# Patient Record
Sex: FEMALE | Race: WHITE | Employment: FULL TIME | URBAN - METROPOLITAN AREA
[De-identification: names, ages, dates, MRNs, and addresses within clinical notes are randomized per-mention and may not be internally consistent; named-entity substitution may affect disease eponyms.]

---

## 2023-03-24 LAB — GROUP B STREP SCREEN: ABNORMAL

## 2023-09-25 PROBLEM — E66.01 MORBID OBESITY WITH BMI OF 40.0-44.9, ADULT (MULTI): Status: ACTIVE | Noted: 2023-09-25

## 2023-09-25 PROBLEM — N76.0 VAGINITIS: Status: ACTIVE | Noted: 2023-09-25

## 2023-09-25 PROBLEM — L40.9 PSORIASIS: Status: ACTIVE | Noted: 2023-09-25

## 2023-09-25 PROBLEM — R73.9 HYPERGLYCEMIA: Status: ACTIVE | Noted: 2023-09-25

## 2023-09-25 PROBLEM — D72.829 LEUKOCYTOSIS: Status: ACTIVE | Noted: 2023-09-25

## 2023-09-25 PROBLEM — E87.6 HYPOKALEMIA: Status: ACTIVE | Noted: 2023-09-25

## 2023-09-25 PROBLEM — E53.8 VITAMIN B12 DEFICIENCY: Status: ACTIVE | Noted: 2023-09-25

## 2023-09-25 PROBLEM — F17.200 CURRENT EVERY DAY SMOKER: Status: ACTIVE | Noted: 2023-09-25

## 2023-09-25 PROBLEM — B37.31 YEAST VAGINITIS: Status: ACTIVE | Noted: 2023-09-25

## 2023-09-25 PROBLEM — I10 BENIGN ESSENTIAL HYPERTENSION: Status: ACTIVE | Noted: 2023-09-25

## 2023-09-25 PROBLEM — A74.9 CHLAMYDIA INFECTION: Status: ACTIVE | Noted: 2023-09-25

## 2023-09-25 PROBLEM — R74.8 ELEVATED ALKALINE PHOSPHATASE LEVEL: Status: ACTIVE | Noted: 2023-09-25

## 2023-09-25 PROBLEM — M19.90 ARTHRITIS: Status: ACTIVE | Noted: 2023-09-25

## 2023-09-25 PROBLEM — N91.5 OLIGOMENORRHEA: Status: ACTIVE | Noted: 2023-09-25

## 2023-09-25 PROBLEM — F41.9 ANXIETY DISORDER: Status: ACTIVE | Noted: 2023-09-25

## 2023-09-25 PROBLEM — O09.90 HIGH RISK PREGNANCY, ANTEPARTUM (HHS-HCC): Status: ACTIVE | Noted: 2023-09-25

## 2023-09-25 PROBLEM — E28.2 POLYCYSTIC OVARIES: Status: ACTIVE | Noted: 2023-09-25

## 2023-09-25 PROBLEM — R73.09 ELEVATED GLUCOSE TOLERANCE TEST: Status: ACTIVE | Noted: 2023-09-25

## 2023-09-25 PROBLEM — N89.8 VAGINAL DISCHARGE: Status: ACTIVE | Noted: 2023-09-25

## 2023-09-25 PROBLEM — O99.210 OBESITY IN PREGNANCY (HHS-HCC): Status: ACTIVE | Noted: 2023-09-25

## 2023-09-25 PROBLEM — O10.919: Status: ACTIVE | Noted: 2023-09-25

## 2023-09-25 PROBLEM — L40.50 PSORIATIC ARTHRITIS (MULTI): Status: ACTIVE | Noted: 2023-09-25

## 2023-09-25 RX ORDER — CLOBETASOL PROPIONATE 0.46 MG/ML
SOLUTION TOPICAL DAILY PRN
COMMUNITY
Start: 2022-10-05

## 2023-09-25 RX ORDER — ESCITALOPRAM OXALATE 20 MG/1
1 TABLET ORAL DAILY
COMMUNITY
Start: 2017-02-06 | End: 2023-12-13

## 2023-09-25 RX ORDER — ACETAMINOPHEN, DEXTROMETHORPHAN HBR, DOXYLAMINE SUCCINATE, PHENYLEPHRINE HCL 650; 20; 12.5; 1 MG/30ML; MG/30ML; MG/30ML; MG/30ML
1 SOLUTION ORAL DAILY
COMMUNITY
Start: 2019-10-17

## 2023-09-25 RX ORDER — METFORMIN HYDROCHLORIDE 500 MG/1
1000 TABLET ORAL DAILY
COMMUNITY

## 2023-09-25 RX ORDER — BUSPIRONE HYDROCHLORIDE 10 MG/1
1 TABLET ORAL 2 TIMES DAILY
COMMUNITY
Start: 2022-06-01 | End: 2023-10-31

## 2023-09-25 RX ORDER — LABETALOL 100 MG/1
200 TABLET, FILM COATED ORAL EVERY 12 HOURS
COMMUNITY
Start: 2022-10-13 | End: 2023-11-22

## 2023-09-25 RX ORDER — BUSPIRONE HYDROCHLORIDE 15 MG/1
15 TABLET ORAL 2 TIMES DAILY
COMMUNITY
End: 2024-03-22

## 2023-09-25 RX ORDER — B-COMPLEX WITH VITAMIN C
1 TABLET ORAL DAILY
COMMUNITY
Start: 2022-01-18

## 2023-10-30 DIAGNOSIS — F41.9 ANXIETY DISORDER, UNSPECIFIED TYPE: Primary | ICD-10-CM

## 2023-10-31 RX ORDER — BUSPIRONE HYDROCHLORIDE 10 MG/1
10 TABLET ORAL 2 TIMES DAILY
Qty: 180 TABLET | Refills: 0 | Status: SHIPPED | OUTPATIENT
Start: 2023-10-31 | End: 2024-02-01

## 2023-11-21 DIAGNOSIS — I10 BENIGN ESSENTIAL HYPERTENSION: Primary | ICD-10-CM

## 2023-11-22 RX ORDER — LABETALOL 100 MG/1
2 TABLET, FILM COATED ORAL EVERY 12 HOURS
Qty: 360 TABLET | Refills: 0 | Status: SHIPPED | OUTPATIENT
Start: 2023-11-22 | End: 2024-04-03

## 2023-12-11 DIAGNOSIS — F41.9 ANXIETY DISORDER, UNSPECIFIED TYPE: Primary | ICD-10-CM

## 2023-12-13 RX ORDER — ESCITALOPRAM OXALATE 20 MG/1
TABLET ORAL
Qty: 90 TABLET | Refills: 0 | Status: SHIPPED | OUTPATIENT
Start: 2023-12-13 | End: 2024-03-12

## 2024-01-18 ENCOUNTER — APPOINTMENT (OUTPATIENT)
Dept: PRIMARY CARE | Facility: CLINIC | Age: 34
End: 2024-01-18
Payer: COMMERCIAL

## 2024-01-25 PROBLEM — N97.9 FEMALE INFERTILITY: Status: ACTIVE | Noted: 2024-01-25

## 2024-01-25 PROBLEM — R10.2 PELVIC PAIN IN FEMALE: Status: ACTIVE | Noted: 2024-01-25

## 2024-01-25 PROBLEM — R53.83 FATIGUE: Status: ACTIVE | Noted: 2024-01-25

## 2024-01-25 PROBLEM — F41.8 MIXED ANXIETY DEPRESSIVE DISORDER: Status: ACTIVE | Noted: 2024-01-25

## 2024-01-29 DIAGNOSIS — F41.9 ANXIETY DISORDER, UNSPECIFIED TYPE: ICD-10-CM

## 2024-01-30 ENCOUNTER — APPOINTMENT (OUTPATIENT)
Dept: PRIMARY CARE | Facility: CLINIC | Age: 34
End: 2024-01-30
Payer: COMMERCIAL

## 2024-02-01 RX ORDER — BUSPIRONE HYDROCHLORIDE 10 MG/1
10 TABLET ORAL 2 TIMES DAILY
Qty: 180 TABLET | Refills: 0 | Status: SHIPPED | OUTPATIENT
Start: 2024-02-01

## 2024-02-06 PROBLEM — O10.919: Status: RESOLVED | Noted: 2023-09-25 | Resolved: 2024-02-06

## 2024-02-13 ENCOUNTER — APPOINTMENT (OUTPATIENT)
Dept: PRIMARY CARE | Facility: CLINIC | Age: 34
End: 2024-02-13
Payer: COMMERCIAL

## 2024-03-11 DIAGNOSIS — F41.9 ANXIETY DISORDER, UNSPECIFIED TYPE: ICD-10-CM

## 2024-03-11 NOTE — LETTER
March 18, 2024        Shantell Crowell  [address]  [facility] [address]            Dear Ms. Crowell:    Please call [phone] to schedule a follow up appointment with  today.    Sincerely,    formerly [facility]

## 2024-03-12 RX ORDER — ESCITALOPRAM OXALATE 20 MG/1
TABLET ORAL
Qty: 30 TABLET | Refills: 0 | Status: SHIPPED | OUTPATIENT
Start: 2024-03-12

## 2024-03-22 DIAGNOSIS — R73.09 ELEVATED GLUCOSE TOLERANCE TEST: ICD-10-CM

## 2024-03-22 DIAGNOSIS — F41.9 ANXIETY DISORDER, UNSPECIFIED TYPE: Primary | ICD-10-CM

## 2024-03-22 DIAGNOSIS — I10 BENIGN ESSENTIAL HYPERTENSION: ICD-10-CM

## 2024-03-22 RX ORDER — METFORMIN HYDROCHLORIDE 500 MG/1
1000 TABLET, EXTENDED RELEASE ORAL DAILY
Qty: 60 TABLET | Refills: 0 | Status: SHIPPED | OUTPATIENT
Start: 2024-03-22

## 2024-03-22 RX ORDER — BUSPIRONE HYDROCHLORIDE 15 MG/1
15 TABLET ORAL EVERY 12 HOURS
Qty: 60 TABLET | Refills: 0 | Status: SHIPPED | OUTPATIENT
Start: 2024-03-22

## 2024-03-26 NOTE — TELEPHONE ENCOUNTER
Left vm message and sent MyChart message to PT to confirm correct dosage and frequency of labetalol.

## 2024-04-03 RX ORDER — LABETALOL 100 MG/1
200 TABLET, FILM COATED ORAL 2 TIMES DAILY
Qty: 360 TABLET | Refills: 1 | Status: SHIPPED | OUTPATIENT
Start: 2024-04-03

## 2024-04-11 PROBLEM — N97.9 INFERTILITY, FEMALE: Status: RESOLVED | Noted: 2024-04-11 | Resolved: 2024-04-11

## 2024-04-11 PROBLEM — O09.90 HIGH RISK PREGNANCY, ANTEPARTUM (HHS-HCC): Status: RESOLVED | Noted: 2024-04-11 | Resolved: 2024-04-11

## 2024-07-11 DIAGNOSIS — I10 BENIGN ESSENTIAL HYPERTENSION: ICD-10-CM

## 2024-07-11 RX ORDER — LABETALOL 100 MG/1
200 TABLET, FILM COATED ORAL 2 TIMES DAILY
Qty: 120 TABLET | Refills: 0 | Status: SHIPPED | OUTPATIENT
Start: 2024-07-11

## 2024-07-18 ENCOUNTER — APPOINTMENT (OUTPATIENT)
Dept: PRIMARY CARE | Facility: CLINIC | Age: 34
End: 2024-07-18
Payer: COMMERCIAL

## 2024-07-18 VITALS
BODY MASS INDEX: 43.19 KG/M2 | WEIGHT: 253 LBS | HEART RATE: 84 BPM | DIASTOLIC BLOOD PRESSURE: 110 MMHG | HEIGHT: 64 IN | SYSTOLIC BLOOD PRESSURE: 159 MMHG

## 2024-07-18 DIAGNOSIS — E66.01 MORBID OBESITY WITH BMI OF 40.0-44.9, ADULT (MULTI): ICD-10-CM

## 2024-07-18 DIAGNOSIS — R73.9 HYPERGLYCEMIA: ICD-10-CM

## 2024-07-18 DIAGNOSIS — F41.9 ANXIETY DISORDER, UNSPECIFIED TYPE: ICD-10-CM

## 2024-07-18 DIAGNOSIS — E53.8 VITAMIN B12 DEFICIENCY: ICD-10-CM

## 2024-07-18 DIAGNOSIS — Z00.00 HEALTHCARE MAINTENANCE: ICD-10-CM

## 2024-07-18 DIAGNOSIS — F17.200 CURRENT EVERY DAY SMOKER: ICD-10-CM

## 2024-07-18 DIAGNOSIS — I10 BENIGN ESSENTIAL HYPERTENSION: Primary | ICD-10-CM

## 2024-07-18 DIAGNOSIS — L40.50 PSORIATIC ARTHRITIS (MULTI): ICD-10-CM

## 2024-07-18 PROCEDURE — 99214 OFFICE O/P EST MOD 30 MIN: CPT | Performed by: FAMILY MEDICINE

## 2024-07-18 PROCEDURE — 3080F DIAST BP >= 90 MM HG: CPT | Performed by: FAMILY MEDICINE

## 2024-07-18 PROCEDURE — 3077F SYST BP >= 140 MM HG: CPT | Performed by: FAMILY MEDICINE

## 2024-07-18 PROCEDURE — 3008F BODY MASS INDEX DOCD: CPT | Performed by: FAMILY MEDICINE

## 2024-07-18 RX ORDER — ACETAMINOPHEN, DEXTROMETHORPHAN HBR, DOXYLAMINE SUCCINATE, PHENYLEPHRINE HCL 650; 20; 12.5; 1 MG/30ML; MG/30ML; MG/30ML; MG/30ML
1 SOLUTION ORAL DAILY
Qty: 90 TABLET | Refills: 1 | Status: SHIPPED | OUTPATIENT
Start: 2024-07-18

## 2024-07-18 RX ORDER — BUSPIRONE HYDROCHLORIDE 15 MG/1
15 TABLET ORAL EVERY 12 HOURS
Qty: 60 TABLET | Refills: 0 | Status: SHIPPED | OUTPATIENT
Start: 2024-07-18

## 2024-07-18 RX ORDER — NIFEDIPINE 30 MG/1
30 TABLET, FILM COATED, EXTENDED RELEASE ORAL DAILY
Qty: 90 TABLET | Refills: 0 | Status: SHIPPED | OUTPATIENT
Start: 2024-07-18 | End: 2024-10-16

## 2024-07-18 RX ORDER — B-COMPLEX WITH VITAMIN C
1 TABLET ORAL DAILY
Qty: 90 TABLET | Refills: 3 | Status: SHIPPED | OUTPATIENT
Start: 2024-07-18

## 2024-07-18 ASSESSMENT — PATIENT HEALTH QUESTIONNAIRE - PHQ9
SUM OF ALL RESPONSES TO PHQ9 QUESTIONS 1 AND 2: 0
1. LITTLE INTEREST OR PLEASURE IN DOING THINGS: NOT AT ALL
2. FEELING DOWN, DEPRESSED OR HOPELESS: NOT AT ALL

## 2024-07-18 NOTE — PROGRESS NOTES
Subjective   Patient ID: Shantell Crowell is a 34 y.o. female who presents for Med Refill.  Patient presents today for follow-up on her chronic medical problems.  She reports that her labetalol has been causing dizziness and she has not been taking it in the morning only in the evening.  She would like to switch to something else.  She took amlodipine before and felt that she did okay with it.  She was switched to to labetalol because of her pregnancy.  She has not currently planning another pregnancy but is using the pullout method to prevent pregnancy.  She states that otherwise she has been doing okay.  Feels that her mood has been good.  She is continued on her Lexapro and BuSpar.  She denies any chest pain or shortness of breath or abdominal pain.  She denies any other concerns.  Med Refill      Social History     Socioeconomic History    Marital status: Single     Spouse name: Not on file    Number of children: Not on file    Years of education: Not on file    Highest education level: Not on file   Occupational History    Not on file   Tobacco Use    Smoking status: Every Day     Current packs/day: 1.00     Average packs/day: 1 pack/day for 7.5 years (7.5 ttl pk-yrs)     Types: Cigarettes     Start date: 2017    Smokeless tobacco: Never   Substance and Sexual Activity    Alcohol use: Not Currently    Drug use: Not Currently    Sexual activity: Not on file   Other Topics Concern    Not on file   Social History Narrative    Not on file     Social Determinants of Health     Financial Resource Strain: Not on file   Food Insecurity: Not on file   Transportation Needs: Not on file   Physical Activity: Not on file   Stress: Not on file   Social Connections: Not on file   Intimate Partner Violence: Not on file   Housing Stability: Not on file     Current Outpatient Medications   Medication Sig Dispense Refill    clobetasol (Temovate) 0.05 % external solution Apply topically once daily as needed.      escitalopram  "(Lexapro) 20 mg tablet TAKE 1 TABLET DAILY (NEED APPOINTMENT) 30 tablet 0    busPIRone (Buspar) 15 mg tablet Take 1 tablet (15 mg) by mouth every 12 hours. 60 tablet 0    cyanocobalamin, vitamin B-12, (Vitamin B-12) 1,000 mcg tablet extended release Take 1 tablet (1,000 mcg) by mouth once daily. 90 tablet 1    NIFEdipine ER (Adalat CC) 30 mg 24 hr tablet Take 1 tablet (30 mg) by mouth once daily. Do not crush, chew, or split. 90 tablet 0    prenatal vitamin, iron-folic, (Prenatal Vitamin) 27 mg iron-800 mcg folic acid tablet Take 1 tablet by mouth once daily. 90 tablet 3     No current facility-administered medications for this visit.     Family History   Problem Relation Name Age of Onset    Hypertension Mother      Other (cardiomegaly) Father      Other (substance abuse) Father      Diabetes Other grandparent     Hypertension Other grandfather     Other (cva) Other uncle     Hypertension Other uncle     Cancer Cousin       Review of Systems  Immunization History   Administered Date(s) Administered    DTP 1992, 1992, 02/01/1993    DTaP, Unspecified 03/01/1994    Flu vaccine (IIV4), preservative free *Check age/dose* 09/26/2019, 09/21/2021    Hepatitis B vaccine, 19 yrs and under (RECOMBIVAX, ENGERIX) 06/18/2002    HiB, unspecified 1992, 1992, 02/01/1993, 03/01/1994    Influenza, seasonal, injectable 10/13/2022    MMR vaccine, subcutaneous (MMR II) 03/01/1994    OPV 1992, 1992, 03/01/1994    Tdap vaccine, age 7 year and older (BOOSTRIX, ADACEL) 06/20/2019, 02/01/2023       Review of Systems negative except as noted in HPI and Chief complaint.     Objective                 BP (!) 159/110 (BP Location: Left arm, Patient Position: Sitting)   Pulse 84   Ht 1.626 m (5' 4\")   Wt 115 kg (253 lb)   BMI 43.43 kg/m²    Physical Exam  Vitals reviewed.   HENT:      Head: Normocephalic and atraumatic.      Right Ear: Tympanic membrane normal.      Left Ear: Tympanic membrane normal.      " Nose: Nose normal.   Eyes:      Extraocular Movements: Extraocular movements intact.      Conjunctiva/sclera: Conjunctivae normal.      Pupils: Pupils are equal, round, and reactive to light.   Cardiovascular:      Rate and Rhythm: Normal rate and regular rhythm.      Pulses: Normal pulses.   Pulmonary:      Effort: Pulmonary effort is normal.      Breath sounds: Normal breath sounds.   Abdominal:      General: There is no distension.      Palpations: Abdomen is soft.      Tenderness: There is no abdominal tenderness.   Musculoskeletal:         General: Normal range of motion.      Cervical back: Normal range of motion and neck supple.      Right lower leg: No edema.      Left lower leg: No edema.   Lymphadenopathy:      Cervical: No cervical adenopathy.   Neurological:      General: No focal deficit present.      Mental Status: She is alert.       No results found for this or any previous visit (from the past 96 hour(s)).    Assessment/Plan   Problem List Items Addressed This Visit       Anxiety disorder     Symptoms overall controlled.  Continue with current dose of Lexapro and BuSpar.         Relevant Medications    busPIRone (Buspar) 15 mg tablet    Benign essential hypertension - Primary     Blood pressure not controlled.  Patient complaining of lightheadedness with labetalol.  We did will DC this and switch her to nifedipine as this is also considered acceptable in pregnancy.  May be more similar to the amlodipine that she previously tolerated.  Plan to follow-up in 1 month.  Check blood work as ordered.         Relevant Medications    NIFEdipine ER (Adalat CC) 30 mg 24 hr tablet    Other Relevant Orders    Albumin-Creatinine Ratio, Urine Random    Lipid Panel    Current every day smoker     Strongly recommend smoking cessation.  Patient reports she is not ready to quit smoking at this point.         Hyperglycemia     Continue to work on healthy diet and exercise.  Check CMP and hemoglobin A1c.          Relevant Orders    Albumin-Creatinine Ratio, Urine Random    Comprehensive Metabolic Panel    CBC and Auto Differential    Hemoglobin A1c    Psoriatic arthritis (Multi)     Patient's not currently using any medications for this.  Continue follow-up with rheumatology.         Vitamin B12 deficiency     Plan to recheck B12 level.  Continue with supplement.         Relevant Medications    cyanocobalamin, vitamin B-12, (Vitamin B-12) 1,000 mcg tablet extended release    Other Relevant Orders    Vitamin B12    Morbid obesity with BMI of 40.0-44.9, adult (Multi)     Other Visit Diagnoses       Healthcare maintenance        Relevant Medications    prenatal vitamin, iron-folic, (Prenatal Vitamin) 27 mg iron-800 mcg folic acid tablet

## 2024-07-18 NOTE — ASSESSMENT & PLAN NOTE
Strongly recommend smoking cessation.  Patient reports she is not ready to quit smoking at this point.

## 2024-07-18 NOTE — ASSESSMENT & PLAN NOTE
Blood pressure not controlled.  Patient complaining of lightheadedness with labetalol.  We did will DC this and switch her to nifedipine as this is also considered acceptable in pregnancy.  May be more similar to the amlodipine that she previously tolerated.  Plan to follow-up in 1 month.  Check blood work as ordered.

## 2024-08-12 ENCOUNTER — APPOINTMENT (OUTPATIENT)
Dept: PRIMARY CARE | Facility: CLINIC | Age: 34
End: 2024-08-12
Payer: COMMERCIAL

## 2024-08-14 DIAGNOSIS — I10 BENIGN ESSENTIAL HYPERTENSION: ICD-10-CM

## 2024-08-14 DIAGNOSIS — F41.9 ANXIETY DISORDER, UNSPECIFIED TYPE: ICD-10-CM

## 2024-08-14 RX ORDER — NIFEDIPINE 30 MG/1
30 TABLET, FILM COATED, EXTENDED RELEASE ORAL DAILY
Qty: 90 TABLET | Refills: 0 | Status: SHIPPED | OUTPATIENT
Start: 2024-08-14 | End: 2024-11-12

## 2024-08-14 RX ORDER — ESCITALOPRAM OXALATE 20 MG/1
20 TABLET ORAL DAILY
Qty: 30 TABLET | Refills: 0 | Status: SHIPPED | OUTPATIENT
Start: 2024-08-14

## 2024-08-14 RX ORDER — BUSPIRONE HYDROCHLORIDE 15 MG/1
15 TABLET ORAL EVERY 12 HOURS
Qty: 60 TABLET | Refills: 0 | Status: SHIPPED | OUTPATIENT
Start: 2024-08-14 | End: 2024-08-16

## 2024-08-15 DIAGNOSIS — F41.9 ANXIETY DISORDER, UNSPECIFIED TYPE: ICD-10-CM

## 2024-08-16 RX ORDER — BUSPIRONE HYDROCHLORIDE 15 MG/1
15 TABLET ORAL EVERY 12 HOURS
Qty: 60 TABLET | Refills: 0 | Status: SHIPPED | OUTPATIENT
Start: 2024-08-16

## 2024-09-24 DIAGNOSIS — F41.9 ANXIETY DISORDER, UNSPECIFIED TYPE: ICD-10-CM

## 2024-09-25 ENCOUNTER — TELEPHONE (OUTPATIENT)
Dept: PRIMARY CARE | Facility: CLINIC | Age: 34
End: 2024-09-25
Payer: COMMERCIAL

## 2024-09-25 DIAGNOSIS — F41.9 ANXIETY DISORDER, UNSPECIFIED TYPE: ICD-10-CM

## 2024-09-25 RX ORDER — BUSPIRONE HYDROCHLORIDE 15 MG/1
15 TABLET ORAL 2 TIMES DAILY
Qty: 180 TABLET | Refills: 0 | Status: SHIPPED | OUTPATIENT
Start: 2024-09-25

## 2024-09-25 RX ORDER — ESCITALOPRAM OXALATE 20 MG/1
20 TABLET ORAL DAILY
Qty: 90 TABLET | Refills: 0 | Status: SHIPPED | OUTPATIENT
Start: 2024-09-25 | End: 2024-09-26

## 2024-09-25 NOTE — TELEPHONE ENCOUNTER
Pt called stating since starting nifedipine she has been dizzy and light headed. She wants to know your opinion on what to do since she cannot get appt until 10/17. She said these are pretty much same symptoms as last bp med.

## 2024-09-26 ENCOUNTER — OFFICE VISIT (OUTPATIENT)
Dept: URGENT CARE | Age: 34
End: 2024-09-26
Payer: COMMERCIAL

## 2024-09-26 VITALS
WEIGHT: 260 LBS | OXYGEN SATURATION: 98 % | TEMPERATURE: 97.9 F | DIASTOLIC BLOOD PRESSURE: 86 MMHG | HEART RATE: 102 BPM | BODY MASS INDEX: 46.07 KG/M2 | HEIGHT: 63 IN | RESPIRATION RATE: 18 BRPM | SYSTOLIC BLOOD PRESSURE: 139 MMHG

## 2024-09-26 DIAGNOSIS — R42 DIZZINESS: ICD-10-CM

## 2024-09-26 DIAGNOSIS — Z53.21 PATIENT LEFT WITHOUT BEING SEEN: Primary | ICD-10-CM

## 2024-09-26 LAB
POC BINAX EXPIRATION: 0
POC BINAX NOW COVID SERIAL NUMBER: 0
POC RAPID INFLUENZA A: NEGATIVE
POC RAPID INFLUENZA B: NEGATIVE
POC SARS-COV-2 AG BINAX: NORMAL

## 2024-09-26 RX ORDER — ESCITALOPRAM OXALATE 20 MG/1
20 TABLET ORAL DAILY
Qty: 90 TABLET | Refills: 0 | Status: SHIPPED | OUTPATIENT
Start: 2024-09-26

## 2024-09-26 NOTE — PROGRESS NOTES
This provider was in doing procedure on patient. Ms Crowell could not wait to be seen as she had to go and  her kids. Pt not seen by provider.

## 2024-10-01 ENCOUNTER — APPOINTMENT (OUTPATIENT)
Dept: PRIMARY CARE | Facility: CLINIC | Age: 34
End: 2024-10-01
Payer: COMMERCIAL

## 2024-10-01 DIAGNOSIS — F41.8 MIXED ANXIETY DEPRESSIVE DISORDER: ICD-10-CM

## 2024-10-01 DIAGNOSIS — R73.9 HYPERGLYCEMIA: ICD-10-CM

## 2024-10-01 DIAGNOSIS — I10 BENIGN ESSENTIAL HYPERTENSION: Primary | ICD-10-CM

## 2024-10-01 DIAGNOSIS — F17.200 CURRENT EVERY DAY SMOKER: ICD-10-CM

## 2024-10-11 DIAGNOSIS — I10 BENIGN ESSENTIAL HYPERTENSION: ICD-10-CM

## 2024-10-14 RX ORDER — NIFEDIPINE 30 MG/1
30 TABLET, FILM COATED, EXTENDED RELEASE ORAL
Qty: 90 TABLET | Refills: 0 | Status: SHIPPED | OUTPATIENT
Start: 2024-10-14

## 2024-10-17 ENCOUNTER — APPOINTMENT (OUTPATIENT)
Dept: PRIMARY CARE | Facility: CLINIC | Age: 34
End: 2024-10-17
Payer: COMMERCIAL

## 2024-12-06 DIAGNOSIS — F41.9 ANXIETY DISORDER, UNSPECIFIED TYPE: ICD-10-CM

## 2024-12-06 RX ORDER — BUSPIRONE HYDROCHLORIDE 15 MG/1
15 TABLET ORAL 2 TIMES DAILY
Qty: 180 TABLET | Refills: 0 | Status: SHIPPED | OUTPATIENT
Start: 2024-12-06

## 2025-01-17 DIAGNOSIS — I10 BENIGN ESSENTIAL HYPERTENSION: ICD-10-CM

## 2025-01-17 RX ORDER — NIFEDIPINE 30 MG/1
30 TABLET, FILM COATED, EXTENDED RELEASE ORAL
Qty: 90 TABLET | Refills: 0 | Status: SHIPPED | OUTPATIENT
Start: 2025-01-17

## 2025-01-28 ENCOUNTER — APPOINTMENT (OUTPATIENT)
Dept: PRIMARY CARE | Facility: CLINIC | Age: 35
End: 2025-01-28
Payer: COMMERCIAL

## 2025-03-06 DIAGNOSIS — F41.9 ANXIETY DISORDER, UNSPECIFIED TYPE: ICD-10-CM

## 2025-03-07 RX ORDER — BUSPIRONE HYDROCHLORIDE 15 MG/1
15 TABLET ORAL 2 TIMES DAILY
Qty: 60 TABLET | Refills: 1 | Status: SHIPPED | OUTPATIENT
Start: 2025-03-07

## 2025-03-13 DIAGNOSIS — F41.9 ANXIETY DISORDER, UNSPECIFIED TYPE: ICD-10-CM

## 2025-03-13 DIAGNOSIS — I10 BENIGN ESSENTIAL HYPERTENSION: ICD-10-CM

## 2025-03-14 RX ORDER — ESCITALOPRAM OXALATE 20 MG/1
20 TABLET ORAL DAILY
Qty: 90 TABLET | Refills: 0 | Status: SHIPPED | OUTPATIENT
Start: 2025-03-14

## 2025-03-14 RX ORDER — NIFEDIPINE 30 MG/1
30 TABLET, FILM COATED, EXTENDED RELEASE ORAL
Qty: 30 TABLET | Refills: 0 | Status: SHIPPED | OUTPATIENT
Start: 2025-03-14 | End: 2025-04-13

## 2025-03-27 ENCOUNTER — APPOINTMENT (OUTPATIENT)
Dept: PRIMARY CARE | Facility: CLINIC | Age: 35
End: 2025-03-27
Payer: COMMERCIAL

## 2025-04-15 DIAGNOSIS — F41.9 ANXIETY DISORDER, UNSPECIFIED TYPE: ICD-10-CM

## 2025-04-15 RX ORDER — BUSPIRONE HYDROCHLORIDE 15 MG/1
15 TABLET ORAL 2 TIMES DAILY
Qty: 60 TABLET | Refills: 0 | Status: SHIPPED | OUTPATIENT
Start: 2025-04-15

## 2025-05-13 ENCOUNTER — APPOINTMENT (OUTPATIENT)
Dept: RHEUMATOLOGY | Facility: CLINIC | Age: 35
End: 2025-05-13
Payer: COMMERCIAL

## 2025-05-13 ENCOUNTER — HOSPITAL ENCOUNTER (OUTPATIENT)
Dept: RADIOLOGY | Facility: CLINIC | Age: 35
Discharge: HOME | End: 2025-05-13
Payer: COMMERCIAL

## 2025-05-13 VITALS
SYSTOLIC BLOOD PRESSURE: 143 MMHG | DIASTOLIC BLOOD PRESSURE: 98 MMHG | BODY MASS INDEX: 43.94 KG/M2 | HEIGHT: 63 IN | WEIGHT: 248 LBS | HEART RATE: 118 BPM

## 2025-05-13 DIAGNOSIS — L40.9 PSORIASIS: ICD-10-CM

## 2025-05-13 DIAGNOSIS — L40.50 PSORIATIC ARTHRITIS (MULTI): Primary | ICD-10-CM

## 2025-05-13 DIAGNOSIS — L40.50 PSORIATIC ARTHRITIS (MULTI): ICD-10-CM

## 2025-05-13 DIAGNOSIS — D84.9 IMMUNOSUPPRESSION: ICD-10-CM

## 2025-05-13 PROCEDURE — 99204 OFFICE O/P NEW MOD 45 MIN: CPT | Performed by: INTERNAL MEDICINE

## 2025-05-13 PROCEDURE — 3008F BODY MASS INDEX DOCD: CPT | Performed by: INTERNAL MEDICINE

## 2025-05-13 PROCEDURE — 72202 X-RAY EXAM SI JOINTS 3/> VWS: CPT | Performed by: RADIOLOGY

## 2025-05-13 PROCEDURE — 72110 X-RAY EXAM L-2 SPINE 4/>VWS: CPT

## 2025-05-13 PROCEDURE — 72202 X-RAY EXAM SI JOINTS 3/> VWS: CPT

## 2025-05-13 PROCEDURE — 3080F DIAST BP >= 90 MM HG: CPT | Performed by: INTERNAL MEDICINE

## 2025-05-13 PROCEDURE — 3077F SYST BP >= 140 MM HG: CPT | Performed by: INTERNAL MEDICINE

## 2025-05-13 PROCEDURE — 72110 X-RAY EXAM L-2 SPINE 4/>VWS: CPT | Performed by: RADIOLOGY

## 2025-05-13 PROCEDURE — 72100 X-RAY EXAM L-S SPINE 2/3 VWS: CPT

## 2025-05-13 NOTE — PROGRESS NOTES
Subjective   Patient ID: [id number]   Shantell Crowell is a 34 y.o. female who presents for Arthritis.  HPI    New patient  Previous diagnosis of Psoriatic arthritis few years < 10 years  Previously saw Dr Edmonds- [place]  Was on SSZ then Methotrexate  5 years ago was on methotrexate - was still in pain , not sure if it was working  Then stopped by herself during pregnacy  Has not been on anything since then  Mentions she was getting recurrent knee swelling  previously arthrocentesis was performed by Dr Sun and was getting CSI   Now has pain in the lower , mid back, radiating forward  With stiffness in the am - lasting1-2 hours  Back pain started a couple of years ago, insidious onset  Back pain is better with movement, worse with rest, nocturnal awakening at 2 pm  No alternate buttock pain  No improvement with NSAIDs  And pain in the bilateral hands with stiffness for an hour  No swelling of the hands or wrists  No dactylitis, no enthesistis, no IBD  No uveitis  Has worsening psoriasis rash  Over the elbows, palms, and feet  Using topicals with not much relief.  Going to see dermatology end of this month    Works as a   ROS  10 system review otherwise neg    Problem List[1]     Medical History[2]     Surgical History[3]     Social History     Socioeconomic History    Marital status: Single     Spouse name: Not on file    Number of children: Not on file    Years of education: Not on file    Highest education level: Not on file   Occupational History    Not on file   Tobacco Use    Smoking status: Every Day     Current packs/day: 1.00     Average packs/day: 1 pack/day for 8.4 years (8.4 ttl pk-yrs)     Types: Cigarettes     Start date: 2017    Smokeless tobacco: Never   Substance and Sexual Activity    Alcohol use: Not Currently    Drug use: Not Currently    Sexual activity: Not on file   Other Topics Concern    Not on file   Social History Narrative    Not on file     Social Drivers of Health     Financial  Resource Strain: Not on file   Food Insecurity: Not on file   Transportation Needs: Not on file   Physical Activity: Not on file   Stress: Not on file   Social Connections: Not on file   Intimate Partner Violence: Not on file   Housing Stability: Not on file        RX Allergies[4]     Current Medications[5]       Objective     Visit Vitals  BP (!) 143/98   Pulse (!) 118        Physical Exam  TJC 3, SJC 0  Onychopathy not seen, wearing artificial nails  No synovitis in the small hand joints, left wrist slightly warm and tender  Right knee mildly swollen, point of care ultrasound shows moderate amount of effusion  No quads or patellar enthesophyte seen  Osteophytosis of the medial and lateral tibiotalar articulation with minimal synovial proliferation    FADIR, GRACIE negative    PSO patch over the elbows, palms and plantar aspect of the feet laterally.  BSA around 4-5%    Nose: no deformity, no crusting   Throat/Mouth: No oral deformities, no cheek swelling, mucosa appear moist, no oral ulcers noted or loss of dentition   Neck/Lymph: FROM, trachea midline  Lungs: chest expansion symmetric. No respiratory distress.   Heart: RRR  Neuro: CN II-XII grossly intact, no focal deficit  Skin: No rashes, ulcers or photosensitive areas  MSK: Upper Extremities:  Hand/Fingers: No erythema, swelling, tenderness or warmth at DIP, PIP, or MCP joints, FROM grossly. Good hand . No nodules. No deformities   Wrists: No erythema, swelling, warmth or tenderness at wrist, FROM grossly  Elbows: No tenderness, swelling, erythema or warmth at elbows, FROM grossly. No nodules   Shoulders: No swelling, erythema, tenderness or warmth at shoulders. FROM  Lower Extremities:   Hips: No obvious deformities. No joint tenderness, normal ROM grossly. Log roll test negative bilaterally. Gracie test is negative bilaterally. No trochanteric bursae TTP  Knees: No tenderness, deformities, swelling, rashes, or warmth, normal ROM grossly. No crepitus, no  "pes anserine bursa TTP   Ankles: No deformities, tenderness, edema, erythema, ulceration, or warmth at the ankle         Lab Results   Component Value Date    WBC 9.6 2023    HGB 11.1 (L) 2023    HCT 34.3 (L) 2023    MCV 85 2023     2023        Chemistry    Lab Results   Component Value Date/Time     2023 0742    K 3.8 2023 0742     2023 0742    CO2 25 2023 0742    BUN 7 2023 0742    CREATININE 0.43 (L) 2023 0742    Lab Results   Component Value Date/Time    CALCIUM 9.0 2023 0742    ALKPHOS 138 (H) 2023 0742    AST 29 2023 0742    ALT 31 2023 0742    BILITOT 0.3 2023 0742           No results found for: \"CRP\"   No results found for: \"CLIFTON\", \"RF\", \"SEDRATE\"   No results found for: \"CKTOTAL\"  Lab Results   Component Value Date    NEUTROABS 10.47 (H) 10/13/2020      No results found for: \"FERRITIN\"   Lab Results   Component Value Date    HEPCAB NONREACTIVE 09/15/2022      Lab Results   Component Value Date    ALT 31 2023    AST 29 2023    ALKPHOS 138 (H) 2023    BILITOT 0.3 2023      No results found for: \"PPD\"   No results found for: \"URICACID\"   Lab Results   Component Value Date    CALCIUM 9.0 2023      No results found for: \"SPEP\", \"UPEP\"   No results found for: \"ALBUR\", \"[id number]\"   .last          US OB follow UP transabdominal approach  Interpreted By:  TRIXIE SEE MD  Indication  ========     Growth for Suspected Problem with Fetal Growth, Class 3 Obesity (BMI   > 40), Hypertension, Benign Essential     History  ======     General History  Smoking:  no  Height  163 cm  Height (ft)  5 ft  Height (in)  4 in  Previous Outcomes    1  Para  0     Maternal Assessment  =================     Height  163 cm  Height (ft)  5 ft  Height (in)  4 in  Weight  115 kg  Weight (lb)  253 lb  Weight gain  0 kg  Weight gain (lb)  0 lb  BMI  43.43 kg/m?  Physical Exam  Initial " weight (lb)  253 lb     Pregnancy  =========     Shen pregnancy. Number of fetuses: 1     Dating  ======     LMP on:  2022  Cycle:  Very certain LMP, regular cycle  [place] by LMP  36 w + 5 d  [place] by LMP:  2023  Conception:  LMP  Ultrasound examination on:  3/22/2023  GA by U/S based upon:  AC, BPD, Femur, HC  GA by U/S  36 w + 2 d  [place] by U/S:  2023  Assigned:  based on the LMP, selected on 2022  Assigned GA  36 w + 5 d  Assigned [place]:  2023     Fetal Growth Overview  =================     Exam date        GA              BPD (mm)          HC (mm)                AC (mm)               FL  (mm)             HL (mm)            EFW (g)  2022        20w 0d         44.9    32%        165.1    12%           146.9    44%        33.5     61%         29.4    17%        332    51%  2023          30w 5d        76.3    35%         283    23%             272.6    65%        58.7      34%                                 1687    48%  2023        36w 5d        88.2     33%        326.7    30%          332     74%           68.6     14%                                 2948     48%     Impression  =========     Ms. Crowell presents for a growth ultrasound. Pregnancy complicated by   cHTN - on labetalol and class  III obesity (BMI 43).     -Normal interval fetal growth  -No malformations were identified on a limited survey  -[facility]      Recommend weekly  testing  Delivery at 38 weeks for well-controlled cHTN. Consideration for   delivery at 37 weeks if cHTN is  difficult to control  Thank you allowing us to participate in the care of your patient     Follow-up  ========     Follow-up as clinically indicated.     General Evaluation  ==============     Cardiac activity present.  bpm. Fetal movements: visualized.   Presentation: cephalic  Placenta: Placental site: posterior  Umbilical cord: Cord vessels: 3 vessel cord  Amniotic fluid: Amount of AF: normal amount. MVP 4.0 cm. ANDRE  13.3 cm.   Q1 3.8 cm, Q2 3.9 cm, Q3 4.0  cm, Q4 1.5 cm     Fetal Biometry  ============     Standard  BPD  88.2 mm  35w 5d 33% Hadlock  OFD  116.7 mm   79% INTERGROWTH-21st  HC  326.7 mm  37w 0d 30% Hadlock  Cerebellum tr  51.0 mm  36w 2d 79% Li  AC  332.0 mm  37w 1d 74% Hadlock  Femur  68.6 mm  35w 2d 14% Hadlock  HC / AC  0.98  EFW  2,948 g  36w 4d 48% Hadlock  EFW (lb)  6 lb  EFW (oz)  8 oz  EFW by:  Hadlock (BPD-HC-AC-FL)  Extended    4.1 mm  Head / Face / Neck  Cephalic index  0.76   4% Nicolaides  Extremities / Bony Struc  FL / BPD  0.78  FL / HC  0.21  FL / AC  0.21  Other Structures  FHR  145 bpm     Fetal Anatomy  ===========     Cranium:  Normal  Lateral ventricles:  Normal  Midline falx:  Normal  Cavum septi pellucidi:  Normal  Cerebellum:  Normal  Cisterna magna:  Normal  Head / Neck  Rt lateral ventricle:  Normal  Lt lateral ventricle:  Normal  Thalami:  Normal  Cerebellar lobes:  Normal  Lips:  Normal  Nose:  Normal  4-chamber view:  Normal  3-vessel view:  Normal  Heart / Thorax  Cardiac axis:  Normal  Diaphragm:  suboptimal  Stomach:  Normal  Kidneys:  Normal  Bladder:  visualized  Abdomen  Stomach:  correct situs  Rt kidney:  Normal  Lt kidney:  Normal  Large bowel:  Normal     Biophysical Profile  ==============     2: Fetal breathing movements  2: Gross body movements  2: Fetal tone  2: Amniotic fluid volume  8/8 Biophysical profile score     Maternal Structures  ===============     Uterus / Cervix  Uterus:  Visualized  Cervix:  Not visualized  Ovaries / Tubes / Adnexa  Rt ovary:  Normal  Lt ovary:  Normal     Method  ======     Transabdominal ultrasound examination. View: Suboptimal view: limited   by maternal body habitus.  Suboptimal view: limited by late gestational age                  Assessment/Plan   Diagnoses and all orders for this visit:  Psoriatic arthritis (Multi)  Inflammatory arthralgias by complaint  And ASAS definition IBP - 4/5 by history  No prior imaging  Was getting  right knee arthrocentesis and steroid shot in the knee which helped  I have no records of prior labs and investigations  Will rule out axial involvement  Can proceed with IL17 blockade after initial labs  Moderate burden of skin psoriasis, and currently monoarticular inflammation of right knee with an objective finding    Immunosuppression  Previously was on SSZ - probably PsO burden was low  Then on methotrexate - not effective  Discusssed TNFi/IL17 , [id number]  Mutually agreed to proceed with more selective biologics IL 17   Need to rule out axial PsA - if imaging ok, IL 12-23 is also potential option in future    Screening labs ordered  Risk of infections ( especially mucosal fungal),  non melanoma skin cancers + no risk of malignancy with IL-17 agents seen in the studies.    HM  Advised to be up to date with vaccinations including Shingles, PCV 20, seasonal flu and Covid  HTN on CCB  Obesity  No HLD, IHD, non smoker    -     Hepatitis C Antibody; Future  -     Hepatitis B Surface Antigen; Future  -     C-Reactive Protein; Future  -     CBC and Auto Differential; Future  -     Comprehensive Metabolic Panel; Future  -     XR sacroiliac joints 3+ views; Future  -     XR lumbar spine 2-3 views; Future   -    TB Spot ; Future  -     ixekizumab (Taltz) 80 mg/mL injection; Inject 1 mL (80 mg) under the skin every 28 (twenty-eight) days.    Going to see dermatology  Follow up x 3 months         Juan Kaur MD FACR   of Medicine  [facility] - Department of Rheumatology  [facility]   Plan, including risks and benefits, was discussed with the patient, informed on how to reach us.     To schedule an appointment, call  [phone] [place] or call [phone] for [facility] (formerly [facility])        [1]   Patient Active Problem List  Diagnosis    Anxiety disorder    Arthritis    Benign essential hypertension    Vaginitis    Chlamydia infection    Current every day smoker    Elevated alkaline  phosphatase level    High risk pregnancy, antepartum (HHS-HCC)    Hyperglycemia    Hypokalemia    Leukocytosis    Obesity in pregnancy (HHS-HCC)    Oligomenorrhea    Polycystic ovaries    Psoriasis    Psoriatic arthritis (Multi)    Vaginal discharge    Vitamin B12 deficiency    Yeast vaginitis    Elevated glucose tolerance test    Morbid obesity with BMI of 40.0-44.9, adult (Multi)    Fatigue    Pelvic pain in female    Mixed anxiety depressive disorder    Female infertility   [2]   Past Medical History:  Diagnosis Date    Anxiety disorder, unspecified     Anxiety and depression    High risk pregnancy, antepartum (HHS-HCC) 04/11/2024    Infertility, female 04/11/2024    Pain in unspecified joint     Pain in joint, multiple sites    Personal history of other diseases of the circulatory system     History of hypertension    Personal history of other diseases of the female genital tract 06/22/2019    History of infertility    Personal history of other diseases of the female genital tract 10/17/2019    History of female infertility    Psoriasis, unspecified 11/09/2021    Psoriasis   [3]   Past Surgical History:  Procedure Laterality Date    OTHER SURGICAL HISTORY  09/15/2022    Surgery   [4] No Known Allergies  [5]   Current Outpatient Medications:     busPIRone (Buspar) 15 mg tablet, TAKE 1 TABLET TWICE A DAY, Disp: 60 tablet, Rfl: 0    clobetasol (Temovate) 0.05 % external solution, Apply topically once daily as needed., Disp: , Rfl:     cyanocobalamin, vitamin B-12, (Vitamin B-12) 1,000 mcg tablet extended release, Take 1 tablet (1,000 mcg) by mouth once daily., Disp: 90 tablet, Rfl: 1    escitalopram (Lexapro) 20 mg tablet, TAKE 1 TABLET ONCE DAILY, Disp: 90 tablet, Rfl: 0    NIFEdipine ER (NIFEdipine CC) 30 mg 24 hr tablet, TAKE ONE TABLET BY MOUTH EVERY MORNING BEFORE MEALS, Disp: 30 tablet, Rfl: 0    prenatal vitamin, iron-folic, (Prenatal Vitamin) 27 mg iron-800 mcg folic acid tablet, Take 1 tablet by mouth once  daily., Disp: 90 tablet, Rfl: 3

## 2025-05-13 NOTE — PATIENT INSTRUCTIONS
I am ordering some blood tests and xray of your lower back    Will get prior auth for Taltz/Cosentyx   Please see dermatologist as well    Follow up x 3 months

## 2025-05-14 ENCOUNTER — SPECIALTY PHARMACY (OUTPATIENT)
Dept: PHARMACY | Facility: CLINIC | Age: 35
End: 2025-05-14

## 2025-05-26 DIAGNOSIS — F41.9 ANXIETY DISORDER, UNSPECIFIED TYPE: ICD-10-CM

## 2025-05-27 ENCOUNTER — APPOINTMENT (OUTPATIENT)
Dept: PRIMARY CARE | Facility: CLINIC | Age: 35
End: 2025-05-27
Payer: COMMERCIAL

## 2025-05-27 DIAGNOSIS — L40.50 PSORIATIC ARTHRITIS (MULTI): Primary | ICD-10-CM

## 2025-05-27 DIAGNOSIS — I10 BENIGN ESSENTIAL HYPERTENSION: ICD-10-CM

## 2025-05-27 RX ORDER — ESCITALOPRAM OXALATE 20 MG/1
20 TABLET ORAL DAILY
Qty: 90 TABLET | Refills: 0 | Status: SHIPPED | OUTPATIENT
Start: 2025-05-27

## 2025-05-27 RX ORDER — NIFEDIPINE 30 MG/1
TABLET, EXTENDED RELEASE ORAL
Qty: 90 TABLET | Refills: 0 | Status: SHIPPED | OUTPATIENT
Start: 2025-05-27

## 2025-06-09 ENCOUNTER — OFFICE VISIT (OUTPATIENT)
Dept: URGENT CARE | Age: 35
End: 2025-06-09
Payer: COMMERCIAL

## 2025-06-09 VITALS
HEART RATE: 106 BPM | SYSTOLIC BLOOD PRESSURE: 135 MMHG | RESPIRATION RATE: 16 BRPM | WEIGHT: 248 LBS | DIASTOLIC BLOOD PRESSURE: 89 MMHG | OXYGEN SATURATION: 98 % | BODY MASS INDEX: 43.93 KG/M2 | TEMPERATURE: 98.6 F

## 2025-06-09 DIAGNOSIS — J01.00 ACUTE NON-RECURRENT MAXILLARY SINUSITIS: Primary | ICD-10-CM

## 2025-06-09 RX ORDER — AMOXICILLIN AND CLAVULANATE POTASSIUM 875; 125 MG/1; MG/1
1 TABLET, FILM COATED ORAL 2 TIMES DAILY
Qty: 20 TABLET | Refills: 0 | Status: SHIPPED | OUTPATIENT
Start: 2025-06-09 | End: 2025-06-19

## 2025-06-09 NOTE — PROGRESS NOTES
Subjective   Patient ID: Shantell Crowell is a 35 y.o. female. They present today with a chief complaint of Sinusitis and Sinus Problem.    History of Present Illness  HPI    Pt presents to urgent care with c/o   sinus pain/pressure, headaches, generally not feeling well for the past several days .  Pt denies CP, SOB, palpitations, fevers, abd pain, n/v/d, sick contacts, recent travel.        Past Medical History  Allergies as of 06/09/2025    (No Known Allergies)       Prescriptions Prior to Admission[1]     Medical History[2]    Surgical History[3]     reports that she has been smoking cigarettes. She started smoking about 8 years ago. She has a 8.4 pack-year smoking history. She has never used smokeless tobacco. She reports that she does not currently use alcohol. She reports that she does not currently use drugs.    Review of Systems  Review of Systems   Constitutional: Negative.    HENT:  Positive for congestion, sinus pressure and sinus pain.    Eyes: Negative.    Respiratory: Negative.     Cardiovascular:  Negative for chest pain and palpitations.   Gastrointestinal: Negative.    Endocrine: Negative.    Genitourinary: Negative.    Musculoskeletal: Negative.    Skin: Negative.    Allergic/Immunologic: Negative.    Neurological:  Positive for headaches.   Hematological: Negative.    Psychiatric/Behavioral: Negative.     All other systems reviewed and are negative.                                 Objective    Vitals:    06/09/25 1730   BP: 135/89   Pulse: 106   Resp: 16   Temp: 37 °C (98.6 °F)   SpO2: 98%   Weight: 112 kg (248 lb)     Patient's last menstrual period was 06/06/2025.    Physical Exam  Vitals and nursing note reviewed.   Constitutional:       General: She is not in acute distress.     Appearance: Normal appearance. She is not ill-appearing or toxic-appearing.   HENT:      Head: Atraumatic.      Right Ear: Tympanic membrane, ear canal and external ear normal.      Left Ear: Tympanic membrane, ear  canal and external ear normal.      Nose: Congestion present.      Mouth/Throat:      Mouth: Mucous membranes are moist.      Pharynx: Oropharynx is clear.   Eyes:      Extraocular Movements: Extraocular movements intact.      Conjunctiva/sclera: Conjunctivae normal.      Pupils: Pupils are equal, round, and reactive to light.   Cardiovascular:      Rate and Rhythm: Normal rate.      Pulses: Normal pulses.      Heart sounds: Normal heart sounds.   Pulmonary:      Effort: Pulmonary effort is normal.      Breath sounds: Normal breath sounds.   Skin:     General: Skin is warm and dry.   Neurological:      General: No focal deficit present.      Mental Status: She is alert and oriented to person, place, and time.   Psychiatric:         Mood and Affect: Mood normal.         Behavior: Behavior normal.         Thought Content: Thought content normal.         Procedures    Point of Care Test & Imaging Results from this visit  No results found for this visit on 06/09/25.   Imaging  No results found.    Cardiology, Vascular, and Other Imaging  No other imaging results found for the past 2 days      Diagnostic study results (if any) were reviewed by SOLITARIO Wolf.    Assessment/Plan   Allergies, medications, history, and pertinent labs/EKGs/Imaging reviewed by SOLITARIO Wolf.     Medical Decision Making  Urgent Care Course: Pt presents to the  with rhinorrhea, cough, sinus congestion.  Patient is afebrile, hemodynamically stable.  Patient denies fever, n/v, cp, sob, ab pain, dysuria, and diarrhea.  Low suspicion for pneumonia/bronchitis given that patient's lungs are clear to auscultation bilaterally.  Given longevity of symptoms, will treat patient for sinusitis with   Augmentin.  Patient given sinusitis and pneumonia warning signs, prescriptions and will f/u with pcp.    Independent Hx provided by: Patient  Social determinant that may affects healthcare: Pharmacy closed  Pt's case/impression  summarized and discussed with: Patient  Likely Dx given clinical picture: Sinusitis   Although not an exhaustive list of Differential Diagnosis (though considered), patient's HPI, PE, and other findings are not suggestive of: Sinusitis, URI, bronchitis, pneumonia, Covid-19, influenza, asthma exacerbation   Patient at time of discharge was clinically well-appearing and HDS for outpatient management. The patient and/or family was given the opportunity to ask questions prior to discharge, understood my verbal discussion of the plans for treatment, expected course, indications to return to ED, and the need for timely follow up as directed.    Condition: Stable     Orders and Diagnoses  Diagnoses and all orders for this visit:  Acute non-recurrent maxillary sinusitis  -     amoxicillin-clavulanate (Augmentin) 875-125 mg tablet; Take 1 tablet by mouth 2 times a day for 10 days.      Medical Admin Record      Patient disposition: Home    Electronically signed by SOLITARIO Wolf  8:49 AM           [1] (Not in a hospital admission)   [2]   Past Medical History:  Diagnosis Date    Anxiety disorder, unspecified     Anxiety and depression    High risk pregnancy, antepartum ([facility]) 04/11/2024    Infertility, female 04/11/2024    Pain in unspecified joint     Pain in joint, multiple sites    Personal history of other diseases of the circulatory system     History of hypertension    Personal history of other diseases of the female genital tract 06/22/2019    History of infertility    Personal history of other diseases of the female genital tract 10/17/2019    History of female infertility    Psoriasis, unspecified 11/09/2021    Psoriasis   [3]   Past Surgical History:  Procedure Laterality Date    OTHER SURGICAL HISTORY  09/15/2022    Surgery

## 2025-06-11 ASSESSMENT — ENCOUNTER SYMPTOMS
GASTROINTESTINAL NEGATIVE: 1
CONSTITUTIONAL NEGATIVE: 1
SINUS PRESSURE: 1
ALLERGIC/IMMUNOLOGIC NEGATIVE: 1
EYES NEGATIVE: 1
SINUS PAIN: 1
PALPITATIONS: 0
PSYCHIATRIC NEGATIVE: 1
HEADACHES: 1
RESPIRATORY NEGATIVE: 1
MUSCULOSKELETAL NEGATIVE: 1
ENDOCRINE NEGATIVE: 1
HEMATOLOGIC/LYMPHATIC NEGATIVE: 1

## 2025-06-25 DIAGNOSIS — F41.9 ANXIETY DISORDER, UNSPECIFIED TYPE: ICD-10-CM

## 2025-06-26 RX ORDER — BUSPIRONE HYDROCHLORIDE 15 MG/1
15 TABLET ORAL 2 TIMES DAILY
Qty: 180 TABLET | Refills: 0 | Status: SHIPPED | OUTPATIENT
Start: 2025-06-26

## 2025-08-11 PROBLEM — Z00.00 HEALTHCARE MAINTENANCE: Status: ACTIVE | Noted: 2025-08-11

## 2025-08-21 ENCOUNTER — APPOINTMENT (OUTPATIENT)
Dept: PRIMARY CARE | Facility: CLINIC | Age: 35
End: 2025-08-21
Payer: COMMERCIAL

## 2025-08-25 DIAGNOSIS — F41.9 ANXIETY DISORDER, UNSPECIFIED TYPE: ICD-10-CM

## 2025-08-25 DIAGNOSIS — I10 BENIGN ESSENTIAL HYPERTENSION: ICD-10-CM

## 2025-08-27 RX ORDER — ESCITALOPRAM OXALATE 20 MG/1
20 TABLET ORAL DAILY
Qty: 90 TABLET | Refills: 0 | Status: SHIPPED | OUTPATIENT
Start: 2025-08-27

## 2025-08-27 RX ORDER — NIFEDIPINE 30 MG/1
90 TABLET, FILM COATED, EXTENDED RELEASE ORAL
Qty: 270 TABLET | Refills: 0 | Status: SHIPPED | OUTPATIENT
Start: 2025-08-27

## 2025-09-02 ENCOUNTER — PATIENT MESSAGE (OUTPATIENT)
Dept: PRIMARY CARE | Facility: CLINIC | Age: 35
End: 2025-09-02
Payer: COMMERCIAL

## 2025-09-02 ENCOUNTER — TELEPHONE (OUTPATIENT)
Dept: PRIMARY CARE | Facility: CLINIC | Age: 35
End: 2025-09-02
Payer: COMMERCIAL

## 2025-10-28 ENCOUNTER — APPOINTMENT (OUTPATIENT)
Dept: PRIMARY CARE | Facility: CLINIC | Age: 35
End: 2025-10-28
Payer: COMMERCIAL